# Patient Record
Sex: FEMALE | URBAN - NONMETROPOLITAN AREA
[De-identification: names, ages, dates, MRNs, and addresses within clinical notes are randomized per-mention and may not be internally consistent; named-entity substitution may affect disease eponyms.]

---

## 2021-05-07 ENCOUNTER — APPOINTMENT (RX ONLY)
Dept: URBAN - NONMETROPOLITAN AREA CLINIC 31 | Facility: CLINIC | Age: 61
Setting detail: DERMATOLOGY
End: 2021-05-07

## 2021-05-07 DIAGNOSIS — Z41.9 ENCOUNTER FOR PROCEDURE FOR PURPOSES OTHER THAN REMEDYING HEALTH STATE, UNSPECIFIED: ICD-10-CM

## 2021-05-07 PROCEDURE — ? IN-HOUSE DISPENSING PHARMACY

## 2021-05-07 PROCEDURE — ? PATIENT SPECIFIC COUNSELING

## 2021-05-07 PROCEDURE — ? BOTOX

## 2021-05-07 NOTE — PROCEDURE: IN-HOUSE DISPENSING PHARMACY
Product 4 Units Dispensed: 0
Product 42 Amount/Unit (Numbers Only): 30
Product 19 Unit Type: mg
Name Of Product 23: Clobetasol Ointment
Product 3 Price/Unit (In Dollars): 45.00
Product 29 Amount/Unit (Numbers Only): 60
Product 3 Unit Type: grams
Product 5 Amount/Unit (Numbers Only): 120
Name Of Product 4: BP 2.5% / Clindamycin Combination Gel
Product 12 Application Directions: Apply to affected area Qday
Product 42 Application Directions: Apply nightly
Product 3 Refills: 3
Name Of Product 2: Acne Triple Gel Combination
Product 23 Price/Unit (In Dollars): 50.00
Name Of Product 5: BP 8% Acne Wash
Name Of Product 10: Female Hormonal Acne Gel
Name Of Product 44: Anti-aging Body Tretinoin 0.05% Cream
Name Of Product 28: Triamcinolone Cream
Name Of Product 31: Urea 40% Cream
Product 44 Amount/Unit (Numbers Only): 240
Send Charges To Patient Encounter: Yes
Product 41 Refills: 1
Product 9 Application Directions: Apply topically to face qhs with moisturizer.
Name Of Product 32: Tacrolimus 0.1% Ointment
Name Of Product 36: Clobetasol solution
Name Of Product 3: Adapalene 0.3% Gel
Product 2 Application Directions: Apply to face daily
Name Of Product 12: Rosacea Triple Combination Gel
Product 7 Price/Unit (In Dollars): 50
Name Of Product 71: Acne Rosacea Lotion Cleanser
Product 6 Application Directions: Apply BID to hands
Product 4 Application Directions: Apply small amount to entire face once daily. Moisturize, wear sunscreen
Product 1 Price/Unit (In Dollars): 35.00
Name Of Product 8: Tretinoin 0.025% / Clindamycin Combination Cream
Name Of Product 21: Anti-Fungal Shampoo
Name Of Product 24: Fluocinolone Scalp and Body Oil
Name Of Product 6: Clindamycin Gel
Product 45 Refills: 2
Product 44 Price/Unit (In Dollars): 90.00
Name Of Product 41: AK Imiquimod Gel
Name Of Product 13: Rosacea Cream
Product 31 Application Directions: Apply to face once nightly at bedtime before retinol.
Product 71 Price/Unit (In Dollars): 45
Name Of Product 11: Metronidazole Gel
Name Of Product 9: 0.5% Tretinoin Cream
Product 13 Price/Unit (In Dollars): $45.00
Product 27 Reyes/Unit (In Dollars): 40.00
Detail Level: Zone
Product 9 Refills: 44
Name Of Product 7: Dapsone 6% Gel
Name Of Product 43: Alopecia Topical Solution for Men
Name Of Product 26: Ketoconazole/ hydrocortisone cream
Name Of Product 42: Skin Brightening Hydroquinone 8% Combination
Product 29 Price/Unit (In Dollars): 65.00
Name Of Product 45: Skin brightening hydroquinone 8%
Name Of Product 25: Fluocinonide Cream
Product 3 Application Directions: Apply topically to face at night
Name Of Product 1: Sodium sulfacetamide 8%
Name Of Product 33: Seborrheic Dermatitis Shampoo **120 ml**
Name Of Product 22: Clobetasol Cream
Name Of Product 27: Hydrocortisone 2.5% / Tranilast 0.5% / Levo 2%

## 2021-05-07 NOTE — PROCEDURE: BOTOX
Total Units: 22
Map Statement: Please see attached map for locations and injection amounts.
Lot #: [id number]
Length To Time In Minutes Device Was In Place: 10
Detail Level: Detailed
Consent: Written consent obtained. Risks include but not limited to lid/brow ptosis, bruising, swelling, diplopia, temporary effect, incomplete chemical denervation.
Post-Care Instructions: Patient instructed to not lie down for 4 hours and limit physical activity for 24 hours.
Expiration Date (Month Year): 09/2023